# Patient Record
Sex: MALE | Race: WHITE | URBAN - METROPOLITAN AREA
[De-identification: names, ages, dates, MRNs, and addresses within clinical notes are randomized per-mention and may not be internally consistent; named-entity substitution may affect disease eponyms.]

---

## 2024-02-14 ENCOUNTER — LAB (OUTPATIENT)
Dept: URBAN - METROPOLITAN AREA CLINIC 74 | Facility: CLINIC | Age: 64
End: 2024-02-14

## 2024-02-14 ENCOUNTER — OV CON (OUTPATIENT)
Dept: URBAN - METROPOLITAN AREA CLINIC 74 | Facility: CLINIC | Age: 64
End: 2024-02-14
Payer: COMMERCIAL

## 2024-02-14 VITALS
DIASTOLIC BLOOD PRESSURE: 76 MMHG | OXYGEN SATURATION: 97 % | TEMPERATURE: 97.7 F | HEART RATE: 79 BPM | BODY MASS INDEX: 25.52 KG/M2 | WEIGHT: 168.4 LBS | SYSTOLIC BLOOD PRESSURE: 124 MMHG | HEIGHT: 68 IN

## 2024-02-14 DIAGNOSIS — Z80.0 FAMILY HISTORY OF COLON CANCER: ICD-10-CM

## 2024-02-14 DIAGNOSIS — R19.7 CHRONIC DIARRHEA: ICD-10-CM

## 2024-02-14 DIAGNOSIS — R63.4 WEIGHT LOSS: ICD-10-CM

## 2024-02-14 PROCEDURE — 99204 OFFICE O/P NEW MOD 45 MIN: CPT | Performed by: INTERNAL MEDICINE

## 2024-02-14 RX ORDER — COLESTIPOL HYDROCHLORIDE 1 G/1
1 TABLET TABLET, FILM COATED ORAL TWICE A DAY
Qty: 180 TABLET | Refills: 3 | OUTPATIENT
Start: 2024-02-14

## 2024-02-14 NOTE — PHYSICAL EXAM NEUROLOGIC:
Ok to place order for Miralax.  How much and how many refills.   oriented to person, place and time , normal sensation , short and long term memory intact

## 2024-02-14 NOTE — HPI-TODAY'S VISIT:
Pt presents for evaluation of diarrhea. But he also has a strong family hx of colon cancer. 1. Diarrhea, post prandial for years. Last 5 years it is getting worse. Watery diarrhea after eating, better if he doesn't eat. Cramps and relief with BMs. No bleeding. No Rx trials. Not seen MD for this. 2. Weight loss. 15lb. Over past few months. No B symptoms.  3. Two brothers had colon cancer and one  from his cancer a few years ago. He called pt and told him there is positive gene in the family for colon cancer and he was told to call all family members. But pt does not know the details. He did not get tested.

## 2024-03-26 ENCOUNTER — COLON (OUTPATIENT)
Dept: URBAN - METROPOLITAN AREA SURGERY CENTER 30 | Facility: SURGERY CENTER | Age: 64
End: 2024-03-26

## 2024-03-26 DIAGNOSIS — Z80.0 FAMILY HISTORY OF COLON CANCER: ICD-10-CM

## 2024-03-26 RX ORDER — COLESTIPOL HYDROCHLORIDE 1 G/1
1 TABLET TABLET, FILM COATED ORAL TWICE A DAY
Qty: 180 TABLET | Refills: 3 | Status: ACTIVE | COMMUNITY
Start: 2024-02-14

## 2024-03-26 NOTE — HPI-TODAY'S VISIT:
Colonoscopy 3/26/24 is negative for polyps. GPP negative EPI negative. Pt feels much better on Colestipol and thus random colon bx not done. See below, I recommend that he see a genetic counselor because he has strong family history of colon cancer in two brothers and one was told "we have a gene".